# Patient Record
Sex: FEMALE | Race: BLACK OR AFRICAN AMERICAN | NOT HISPANIC OR LATINO | Employment: OTHER | URBAN - METROPOLITAN AREA
[De-identification: names, ages, dates, MRNs, and addresses within clinical notes are randomized per-mention and may not be internally consistent; named-entity substitution may affect disease eponyms.]

---

## 2017-02-08 ENCOUNTER — TELEPHONE (OUTPATIENT)
Dept: RHEUMATOLOGY | Facility: CLINIC | Age: 77
End: 2017-02-08

## 2017-02-08 NOTE — TELEPHONE ENCOUNTER
Pt. Verbalized that she is unable to make appointment to Dr. Green's appointment because of transportation issues, and ask that I let Dr. Ennis know.

## 2017-02-08 NOTE — TELEPHONE ENCOUNTER
Spoke with Shara, Lead Coordinator at Dr. Erickson Hilliard's office, and verbalized to her that the patient wanted the doctor to know that she has transportation problems and cannot make it to the [facility] to see Dr. Pandya. Shara Verbalized understanding, and reported that she would get the message to the doctor.

## 2017-02-09 ENCOUNTER — TELEPHONE (OUTPATIENT)
Dept: RHEUMATOLOGY | Facility: CLINIC | Age: 77
End: 2017-02-09

## 2017-02-09 NOTE — TELEPHONE ENCOUNTER
----- Message from Jocelyn Christopher sent at 2/9/2017 12:21 PM CST -----  Contact: self/[phone]  Patient would like to you about the appt on 2/16/17.  Please advise

## 2017-02-09 NOTE — TELEPHONE ENCOUNTER
I addressed the patients questions concerning appt info   And verbalized  that we do take [employer] health plan, and to bring her insurance card the day of the visit. Pt. Verbalized understanding.

## 2017-02-16 ENCOUNTER — INITIAL CONSULT (OUTPATIENT)
Dept: RHEUMATOLOGY | Facility: CLINIC | Age: 77
End: 2017-02-16
Payer: MEDICARE

## 2017-02-16 ENCOUNTER — HOSPITAL ENCOUNTER (OUTPATIENT)
Dept: RADIOLOGY | Facility: HOSPITAL | Age: 77
Discharge: HOME | End: 2017-02-16
Attending: INTERNAL MEDICINE
Payer: MEDICARE

## 2017-02-16 VITALS
DIASTOLIC BLOOD PRESSURE: 58 MMHG | BODY MASS INDEX: 23.36 KG/M2 | HEIGHT: 59 IN | HEART RATE: 89 BPM | WEIGHT: 115.88 LBS | SYSTOLIC BLOOD PRESSURE: 119 MMHG

## 2017-02-16 DIAGNOSIS — R53.83 FATIGUE, UNSPECIFIED TYPE: ICD-10-CM

## 2017-02-16 DIAGNOSIS — M25.522 ARTHRALGIA OF LEFT ELBOW: ICD-10-CM

## 2017-02-16 DIAGNOSIS — M13.0 POLYARTHRITIS: Primary | ICD-10-CM

## 2017-02-16 PROCEDURE — 99999 PR PBB SHADOW E&M-EST. PATIENT-LVL III: CPT | Mod: PBBFAC,,, | Performed by: INTERNAL MEDICINE

## 2017-02-16 PROCEDURE — 1159F MED LIST DOCD IN RCRD: CPT | Mod: S$GLB,,, | Performed by: INTERNAL MEDICINE

## 2017-02-16 PROCEDURE — 73030 X-RAY EXAM OF SHOULDER: CPT | Mod: TC,50,LT

## 2017-02-16 PROCEDURE — 73030 X-RAY EXAM OF SHOULDER: CPT | Mod: 26,LT,, | Performed by: RADIOLOGY

## 2017-02-16 PROCEDURE — 73090 X-RAY EXAM OF FOREARM: CPT | Mod: 26,LT,, | Performed by: RADIOLOGY

## 2017-02-16 PROCEDURE — 1160F RVW MEDS BY RX/DR IN RCRD: CPT | Mod: S$GLB,,, | Performed by: INTERNAL MEDICINE

## 2017-02-16 PROCEDURE — 73090 X-RAY EXAM OF FOREARM: CPT | Mod: TC,LT

## 2017-02-16 PROCEDURE — 99205 OFFICE O/P NEW HI 60 MIN: CPT | Mod: S$GLB,,, | Performed by: INTERNAL MEDICINE

## 2017-02-16 PROCEDURE — 73070 X-RAY EXAM OF ELBOW: CPT | Mod: 26,LT,, | Performed by: RADIOLOGY

## 2017-02-16 PROCEDURE — 73070 X-RAY EXAM OF ELBOW: CPT | Mod: TC,LT

## 2017-02-16 PROCEDURE — 73030 X-RAY EXAM OF SHOULDER: CPT | Mod: 26,RT,, | Performed by: RADIOLOGY

## 2017-02-16 PROCEDURE — 1125F AMNT PAIN NOTED PAIN PRSNT: CPT | Mod: S$GLB,,, | Performed by: INTERNAL MEDICINE

## 2017-02-16 PROCEDURE — 1157F ADVNC CARE PLAN IN RCRD: CPT | Mod: S$GLB,,, | Performed by: INTERNAL MEDICINE

## 2017-02-16 ASSESSMENT — ROUTINE ASSESSMENT OF PATIENT INDEX DATA (RAPID3)
PATIENT GLOBAL ASSESSMENT SCORE: 9
WHEN YOU AWAKENED IN THE MORNING OVER THE LAST WEEK, PLEASE INDICATE THE AMOUNT OF TIME IT TAKES UNTIL YOU ARE AS LIMBER AS YOU WILL BE FOR THE DAY: 4 HRS
FATIGUE SCORE: 9.5
TOTAL RAPID3 SCORE: 6.83
PAIN SCORE: 9.5
MDHAQ FUNCTION SCORE: .6
AM STIFFNESS SCORE: 1, YES
PSYCHOLOGICAL DISTRESS SCORE: .1

## 2017-02-16 NOTE — PROGRESS NOTES
Subjective:       Patient ID: Amber Loo is a 76 y.o. female.    Chief Complaint: Consult    HPI    75 yo F with PMH of pulmonary fibrosis diagnosed about 2015 on prednisone 10mg a day on oxygen for 2.5 years,  nasal septal perforation, CABG in 2000, glaucoma, DMII here for evaluation.  Reports she was diagnosed with pulmonary fibrosis in 2015 with shortness of breath.  She reports she has been on oxygen since diagnosis.  Denies any epistaxis. She has dry cough.  Denies hemoptysis. Reports pain in left shoulder, left elbow, and entire arm up to wrist.  Denies trauma to arm. Reports that even touching the skin hurt.  Denies any rashes, oral ulcers, raynayds, hair loss. Pain level in right arm can be as high as 10/10. Pain is aching and non-radiation.  Denies swelling of joints or joint stiffness.  Reports that she has chronic fatigue since her lung diagnosis.        Review of Systems   Constitutional: Negative for activity change, appetite change, chills, diaphoresis and fatigue.   HENT: Negative for congestion, ear discharge, ear pain, facial swelling, mouth sores, sinus pressure, sneezing, sore throat, tinnitus and trouble swallowing.    Eyes: Negative for photophobia, pain, discharge, redness, itching and visual disturbance.   Respiratory: Negative for apnea, chest tightness, shortness of breath, wheezing and stridor.    Cardiovascular: Negative for leg swelling.   Gastrointestinal: Negative for abdominal distention, abdominal pain, anal bleeding, blood in stool, constipation, diarrhea and nausea.   Endocrine: Negative for cold intolerance and heat intolerance.   Genitourinary: Negative for difficulty urinating and dysuria.   Musculoskeletal: Positive for arthralgias, gait problem and myalgias. Negative for back pain, joint swelling, neck pain and neck stiffness.   Skin: Negative for color change, pallor, rash and wound.   Neurological: Negative for dizziness, seizures, light-headedness and numbness.  "  Hematological: Negative for adenopathy. Does not bruise/bleed easily.   Psychiatric/Behavioral: Negative for sleep disturbance. The patient is not nervous/anxious.            Objective:     Visit Vitals    BP (!) 119/58 (BP Location: Right arm, Patient Position: Sitting, BP Method: Automatic)    Pulse 89    Ht 4' 11" (1.499 m)    Wt 52.6 kg (115 lb 14.4 oz)    BMI 23.41 kg/m2        Physical Exam   Constitutional: She is oriented to person, place, and time.   HENT:   Head: Normocephalic and atraumatic.   Right Ear: External ear normal.   Left Ear: External ear normal.   Nose: Nose normal.   Mouth/Throat: Oropharynx is clear and moist. No oropharyngeal exudate.   Eyes: Conjunctivae and EOM are normal. Pupils are equal, round, and reactive to light. Right eye exhibits no discharge. Left eye exhibits no discharge. No scleral icterus.   Neck: Neck supple. No JVD present. No thyromegaly present.   Cardiovascular: Normal rate, regular rhythm, normal heart sounds and intact distal pulses.  Exam reveals no gallop and no friction rub.    No murmur heard.  Pulmonary/Chest: Effort normal and breath sounds normal. No respiratory distress. She has no wheezes. She has no rales. She exhibits no tenderness.   Abdominal: Soft. Bowel sounds are normal. She exhibits no distension and no mass. There is no tenderness. There is no rebound and no guarding.   Lymphadenopathy:     She has no cervical adenopathy.   Neurological: She is alert and oriented to person, place, and time. No cranial nerve deficit. Gait normal. Coordination normal.   Skin: Skin is dry. No rash noted. No erythema. No pallor.     Psychiatric: Affect and judgment normal.   Musculoskeletal: She exhibits tenderness. She exhibits no edema or deformity.   Left shoulder with restricted ROM to 160 degrees  Pain with touching skin in left arm  No synovitis in left arm  Hands, wrists, knees, ankles,feet: no synovitis            outside   labs: reviewed   " RF-165  Bonifacio-positive  C-anca:negative [id number]  rf-65  Bonifacio-positive        Assessment:     77 yo F with PMH of pulmonary fibrosis diagnosed about 2015 on prednisone 10mg a day on oxygen for 2.5 years,  nasal septal perforation, CABG in 2000, glaucoma, DMII here for evaluation.  She has left sided pain out of proportion to exam since July 2016. Reports seeing multiple specialists including ortho and neurologist without diagnosis.Regards her shoulder pain, I suspect she may have complex regional pain syndrome but will get basic work up.    As regards to her +RF, and BONIFACIO , I will evaluate her for connective tissue diseases that may also be associated with ILD.  She does not have synovitis so do not think she has rheumatoid arthritis but will finish the evaluation.  Regarding her history of nasal perforation, she does not have signs of vasculitis and anca serologies were negative.  Labs  xrays  rtc pending results

## 2017-02-17 ENCOUNTER — TELEPHONE (OUTPATIENT)
Dept: RHEUMATOLOGY | Facility: CLINIC | Age: 77
End: 2017-02-17

## 2017-02-17 DIAGNOSIS — M15.9 PRIMARY OSTEOARTHRITIS INVOLVING MULTIPLE JOINTS: Primary | ICD-10-CM

## 2017-02-17 DIAGNOSIS — J84.10 PULMONARY FIBROSIS: Primary | ICD-10-CM

## 2017-02-17 RX ORDER — MULTIVIT WITH MINERALS/HERBS
1 TABLET ORAL DAILY
COMMUNITY

## 2017-02-17 RX ORDER — NITROGLYCERIN 0.4 MG/1
0.4 TABLET SUBLINGUAL EVERY 5 MIN PRN
COMMUNITY

## 2017-02-17 RX ORDER — INSULIN GLARGINE 100 [IU]/ML
20 INJECTION, SOLUTION SUBCUTANEOUS DAILY
COMMUNITY

## 2017-02-17 RX ORDER — IPRATROPIUM BROMIDE AND ALBUTEROL SULFATE 2.5; .5 MG/3ML; MG/3ML
3 SOLUTION RESPIRATORY (INHALATION) EVERY 6 HOURS PRN
COMMUNITY

## 2017-03-01 ENCOUNTER — TELEPHONE (OUTPATIENT)
Dept: RHEUMATOLOGY | Facility: CLINIC | Age: 77
End: 2017-03-01

## 2017-03-01 DIAGNOSIS — R76.8 RHEUMATOID FACTOR POSITIVE: Primary | ICD-10-CM

## 2017-03-01 NOTE — TELEPHONE ENCOUNTER
"Verbalized to pt. That per Dr. Pandya "tell her most of blood work is back and I suspect she may have rheumatoid arthritis and need her to do additional blood work and xrays. ". The pt. Reported that she will have to call me back because she needs to check with her insurance company about the cost.  "

## 2017-03-01 NOTE — TELEPHONE ENCOUNTER
Reviewed outside labs:  2/20/2016  Cbc- wnl  cmp-wnl  Hep B core, s Ag-negative  Hep C-negative  Ccp>250  Ace- wnl  Ck-296  Awaiting spep report: if not have final at next visit, will need to call lab devi at [facility] [phone] or [phone]

## 2017-03-01 NOTE — TELEPHONE ENCOUNTER
Reviewed outside note from pulmonary 9/2016.  Patient on prednisone 10mg day for pulmonary fibrosis and last CAT scan per report is stable.    Pulmonologist is .

## 2017-06-28 ENCOUNTER — OFFICE VISIT (OUTPATIENT)
Dept: RHEUMATOLOGY | Facility: CLINIC | Age: 77
End: 2017-06-28
Payer: MEDICARE

## 2017-06-28 ENCOUNTER — LAB VISIT (OUTPATIENT)
Dept: LAB | Facility: HOSPITAL | Age: 77
End: 2017-06-28
Attending: INTERNAL MEDICINE
Payer: MEDICARE

## 2017-06-28 VITALS
BODY MASS INDEX: 20.76 KG/M2 | SYSTOLIC BLOOD PRESSURE: 132 MMHG | HEIGHT: 59 IN | HEART RATE: 74 BPM | DIASTOLIC BLOOD PRESSURE: 86 MMHG | WEIGHT: 103 LBS

## 2017-06-28 DIAGNOSIS — M13.0 POLYARTHRITIS: ICD-10-CM

## 2017-06-28 DIAGNOSIS — M94.9 DISORDER OF CARTILAGE: ICD-10-CM

## 2017-06-28 LAB
25(OH)D3+25(OH)D2 SERPL-MCNC: 54 NG/ML
25(OH)D3+25(OH)D2 SERPL-MCNC: 54 NG/ML
CRP SERPL-MCNC: 82.9 MG/L
ERYTHROCYTE [SEDIMENTATION RATE] IN BLOOD BY WESTERGREN METHOD: 77 MM/HR

## 2017-06-28 PROCEDURE — 82306 VITAMIN D 25 HYDROXY: CPT

## 2017-06-28 PROCEDURE — 86334 IMMUNOFIX E-PHORESIS SERUM: CPT

## 2017-06-28 PROCEDURE — 84165 PROTEIN E-PHORESIS SERUM: CPT | Mod: 26,,, | Performed by: PATHOLOGY

## 2017-06-28 PROCEDURE — 84165 PROTEIN E-PHORESIS SERUM: CPT

## 2017-06-28 PROCEDURE — 1125F AMNT PAIN NOTED PAIN PRSNT: CPT | Mod: S$GLB,,, | Performed by: INTERNAL MEDICINE

## 2017-06-28 PROCEDURE — 1159F MED LIST DOCD IN RCRD: CPT | Mod: S$GLB,,, | Performed by: INTERNAL MEDICINE

## 2017-06-28 PROCEDURE — 86334 IMMUNOFIX E-PHORESIS SERUM: CPT | Mod: 26,,, | Performed by: PATHOLOGY

## 2017-06-28 PROCEDURE — 86140 C-REACTIVE PROTEIN: CPT

## 2017-06-28 PROCEDURE — 99214 OFFICE O/P EST MOD 30 MIN: CPT | Mod: S$GLB,,, | Performed by: INTERNAL MEDICINE

## 2017-06-28 PROCEDURE — 36415 COLL VENOUS BLD VENIPUNCTURE: CPT

## 2017-06-28 PROCEDURE — 86038 ANTINUCLEAR ANTIBODIES: CPT

## 2017-06-28 PROCEDURE — 99999 PR PBB SHADOW E&M-EST. PATIENT-LVL III: CPT | Mod: PBBFAC,,, | Performed by: INTERNAL MEDICINE

## 2017-06-28 PROCEDURE — 85652 RBC SED RATE AUTOMATED: CPT

## 2017-06-28 RX ORDER — HYDROXYCHLOROQUINE SULFATE 200 MG/1
200 TABLET, FILM COATED ORAL DAILY
Qty: 30 TABLET | Refills: 8 | Status: SHIPPED | OUTPATIENT
Start: 2017-06-28 | End: 2017-06-28 | Stop reason: SDUPTHER

## 2017-06-28 RX ORDER — HYDROXYCHLOROQUINE SULFATE 200 MG/1
200 TABLET, FILM COATED ORAL DAILY
Qty: 30 TABLET | Refills: 8 | Status: SHIPPED | OUTPATIENT
Start: 2017-06-28 | End: 2017-06-30 | Stop reason: SDUPTHER

## 2017-06-28 NOTE — PROGRESS NOTES
Subjective:       Patient ID: Amber Loo is a 76 y.o. female.    Chief Complaint: Consult    HPI    75 yo F with PMH of pulmonary fibrosis diagnosed about 2015 on prednisone 10mg a day on oxygen for 2.5 years,  nasal septal perforation, CABG in 2000, glaucoma, DMII here for evaluation.  Reports she was diagnosed with pulmonary fibrosis in 2015 with shortness of breath.  She reports she has been on oxygen since diagnosis.  Denies any epistaxis. She has dry cough.  Denies hemoptysis. Reports pain in left shoulder, left elbow, and entire arm up to wrist.  Denies trauma to arm. Reports that even touching the skin hurt.  Denies any rashes, oral ulcers, raynayds, hair loss. Pain level in left  arm can be as high as 10/10. Pain is aching and non-radiation.  Denies swelling of joints or joint stiffness.  Reports that she has chronic fatigue since her lung diagnosis.      Interval history: She saw pulmonary doctor  2 months and no changes were made to her therapy.   She is on 4 L oxygen which is her normal. She continues to have pain in left arm. Pain can be a high as 10/10.  Reports mild swelling in left wrist for about a year. Reports morning stiffness for about an hour.  Denies any rashes, oral ulcers, raynayds, hair loss.  Reports she has taken up to 60mg a day of prednisone with no improvement.        Review of Systems   Constitutional: Negative for activity change, appetite change, chills, diaphoresis and fatigue.   HENT: Negative for congestion, ear discharge, ear pain, facial swelling, mouth sores, sinus pressure, sneezing, sore throat, tinnitus and trouble swallowing.    Eyes: Negative for photophobia, pain, discharge, redness, itching and visual disturbance.   Respiratory: Negative for apnea, chest tightness, shortness of breath, wheezing and stridor.    Cardiovascular: Negative for leg swelling.   Gastrointestinal: Negative for abdominal distention, abdominal pain, anal bleeding, blood in stool,  constipation, diarrhea and nausea.   Endocrine: Negative for cold intolerance and heat intolerance.   Genitourinary: Negative for difficulty urinating and dysuria.   Musculoskeletal: Positive for arthralgias, gait problem and myalgias. Negative for back pain, joint swelling, neck pain and neck stiffness.   Skin: Negative for color change, pallor, rash and wound.   Neurological: Negative for dizziness, seizures, light-headedness and numbness.   Hematological: Negative for adenopathy. Does not bruise/bleed easily.   Psychiatric/Behavioral: Negative for sleep disturbance. The patient is not nervous/anxious.            Objective:       Physical Exam   Constitutional: She is oriented to person, place, and time.   HENT:   Head: Normocephalic and atraumatic.   Right Ear: External ear normal.   Left Ear: External ear normal.   Nose: Nose normal.   Mouth/Throat: Oropharynx is clear and moist. No oropharyngeal exudate.   Eyes: Conjunctivae and EOM are normal. Pupils are equal, round, and reactive to light. Right eye exhibits no discharge. Left eye exhibits no discharge. No scleral icterus.   Neck: Neck supple. No JVD present. No thyromegaly present.   Cardiovascular: Normal rate, regular rhythm, normal heart sounds and intact distal pulses.  Exam reveals no gallop and no friction rub.    No murmur heard.  Pulmonary/Chest: Effort normal and breath sounds normal. No respiratory distress. She has no wheezes. She has no rales. She exhibits no tenderness.   Abdominal: Soft. Bowel sounds are normal. She exhibits no distension and no mass. There is no tenderness. There is no rebound and no guarding.   Lymphadenopathy:     She has no cervical adenopathy.   Neurological: She is alert and oriented to person, place, and time. No cranial nerve deficit. Gait normal. Coordination normal.   Skin: Skin is dry. No rash noted. No erythema. No pallor.     Psychiatric: Affect and judgment normal.   Musculoskeletal: She exhibits tenderness. She  exhibits no edema or deformity.   Left shoulder with restricted ROM to 160 degrees  Pain with touching skin in left arm  No synovitis in left arm  Hands, wrists, knees, ankles,feet: no synovitis            outside   labs: reviewed   RF-165  Samreen-positive  C-anca:negative [id number]  rf-65      2/20/2016  Cbc- wnl  cmp-wnl  Hep B core, s Ag-negative  Hep C-negative  Ccp>250  Ace- wnl  Ck-296    Assessment:     77 yo F with PMH of pulmonary fibrosis diagnosed about 2015 on prednisone 10mg a day on oxygen for 2.5 years,  nasal septal perforation, CABG in 2000, glaucoma, DMII here for evaluation.  She has left sided pain out of proportion to exam since July 2016. Reports seeing multiple specialists including ortho and neurologist without diagnosis.  I suspect that most of her pain in left upper extremity is from left OA and complex regional pain syndrome.  Her serologies are consistent with RA so will get baseline xrays of other joints and will start her on plaquenil.  She also has mild synovitis in mcps, left wrist, and left elbow.      Reviewed outside note from pulmonary 9/2016.  Patient on prednisone 10mg day for pulmonary fibrosis and last CAT scan per report is stable.  Pulmonologist is .    Follow up with pulmonary   Labs today  Arthritis survey  Start plaquenil 200mg po qday (Risks of starting plaquenil discussed. Risks include eye toxicity and agrees on timely follow up with optho to avoid risks of eye toxicity. Other risks include rashes such has hyperpigmentation and vertigo.

## 2017-06-29 ENCOUNTER — TELEPHONE (OUTPATIENT)
Dept: RHEUMATOLOGY | Facility: CLINIC | Age: 77
End: 2017-06-29

## 2017-06-29 LAB
ALBUMIN SERPL ELPH-MCNC: 3.4 G/DL
ALPHA1 GLOB SERPL ELPH-MCNC: 0.49 G/DL
ALPHA2 GLOB SERPL ELPH-MCNC: 1.16 G/DL
ANA SER QL IF: NORMAL
B-GLOBULIN SERPL ELPH-MCNC: 0.92 G/DL
GAMMA GLOB SERPL ELPH-MCNC: 1.04 G/DL
PROT SERPL-MCNC: 7 G/DL

## 2017-06-29 RX ORDER — HYDROXYCHLOROQUINE SULFATE 200 MG/1
200 TABLET, FILM COATED ORAL DAILY
Qty: 30 TABLET | Refills: 8 | Status: CANCELLED | OUTPATIENT
Start: 2017-06-29

## 2017-06-30 LAB
INTERPRETATION SERPL IFE-IMP: NORMAL
PATHOLOGIST INTERPRETATION IFE: NORMAL
PATHOLOGIST INTERPRETATION SPE: NORMAL

## 2017-06-30 RX ORDER — HYDROXYCHLOROQUINE SULFATE 200 MG/1
200 TABLET, FILM COATED ORAL DAILY
Qty: 30 TABLET | Refills: 8 | Status: SHIPPED | OUTPATIENT
Start: 2017-06-30 | End: 2017-07-03 | Stop reason: SDUPTHER

## 2017-07-03 ENCOUNTER — TELEPHONE (OUTPATIENT)
Dept: RHEUMATOLOGY | Facility: CLINIC | Age: 77
End: 2017-07-03

## 2017-07-03 RX ORDER — HYDROXYCHLOROQUINE SULFATE 200 MG/1
200 TABLET, FILM COATED ORAL DAILY
Qty: 30 TABLET | Refills: 8 | Status: SHIPPED | OUTPATIENT
Start: 2017-07-03

## 2017-10-11 ENCOUNTER — OFFICE VISIT (OUTPATIENT)
Dept: FAMILY MEDICINE | Facility: CLINIC | Age: 77
End: 2017-10-11
Payer: MEDICARE

## 2017-10-11 VITALS
OXYGEN SATURATION: 70 % | BODY MASS INDEX: 22.26 KG/M2 | HEIGHT: 59 IN | RESPIRATION RATE: 22 BRPM | TEMPERATURE: 98 F | DIASTOLIC BLOOD PRESSURE: 62 MMHG | WEIGHT: 110.44 LBS | HEART RATE: 93 BPM | SYSTOLIC BLOOD PRESSURE: 130 MMHG

## 2017-10-11 DIAGNOSIS — Z23 FLU VACCINE NEED: ICD-10-CM

## 2017-10-11 DIAGNOSIS — Z23 NEED FOR PNEUMOCOCCAL VACCINE: ICD-10-CM

## 2017-10-11 DIAGNOSIS — J84.10 PULMONARY FIBROSIS: Primary | ICD-10-CM

## 2017-10-11 DIAGNOSIS — Z95.1 HX OF CABG: ICD-10-CM

## 2017-10-11 PROCEDURE — 99205 OFFICE O/P NEW HI 60 MIN: CPT | Mod: S$GLB,,, | Performed by: FAMILY MEDICINE

## 2017-10-11 PROCEDURE — G0008 ADMIN INFLUENZA VIRUS VAC: HCPCS | Mod: S$GLB,,, | Performed by: FAMILY MEDICINE

## 2017-10-11 PROCEDURE — 99999 PR PBB SHADOW E&M-EST. PATIENT-LVL V: CPT | Mod: PBBFAC,,, | Performed by: FAMILY MEDICINE

## 2017-10-11 PROCEDURE — 90662 IIV NO PRSV INCREASED AG IM: CPT | Mod: S$GLB,,, | Performed by: FAMILY MEDICINE

## 2017-10-11 PROCEDURE — 90670 PCV13 VACCINE IM: CPT | Mod: S$GLB,,, | Performed by: FAMILY MEDICINE

## 2017-10-11 PROCEDURE — G0009 ADMIN PNEUMOCOCCAL VACCINE: HCPCS | Mod: S$GLB,,, | Performed by: FAMILY MEDICINE

## 2017-10-11 RX ORDER — INSULIN LISPRO 100 [IU]/ML
5 INJECTION, SOLUTION INTRAVENOUS; SUBCUTANEOUS
COMMUNITY

## 2017-10-11 NOTE — PROGRESS NOTES
Pt tolerated flu vaccine to left deltoid without difficulty; no adverse reaction noted; VIS given; pt also tolerated pneumococcal 13 vaccine to right deltoid without difficulty; no adverse reaction noted; VIS given

## 2017-10-13 NOTE — PROGRESS NOTES
Subjective:       Patient ID: Amber Loo is a 77 y.o. female.    Chief Complaint: Establish Care    HPI:  Patient is a 77-year-old , new to me, here to establish care.  She has a known history of CAD, status post CABG x 3 in  2000, pulmonary fibrosis for which she is oxygen  Dependent and chronic uncontrolled diabetes.  Patient does not  Report any recent problems with her heart.  She is followed by Dr Saleh for pulmonary fibrosis.  She states her blood sugars are always elevated because ofchronic steroid therapy.  She has never been seen by an endocrinologist.  She is overdue for eye exam. Patient also followed by rheumatology for polyarthritis.  She is on plaquenil.  Review of Systems   Constitutional: Positive for fatigue. Negative for appetite change, chills, diaphoresis and fever.   HENT: Negative for hearing loss, sinus pressure and trouble swallowing.    Eyes: Negative for visual disturbance.   Respiratory: Positive for shortness of breath. Negative for cough, chest tightness and wheezing.    Cardiovascular: Negative for chest pain, palpitations and leg swelling.   Gastrointestinal: Negative for abdominal pain, blood in stool, constipation, diarrhea, nausea and vomiting.   Endocrine: Negative for polydipsia, polyphagia and polyuria.   Genitourinary: Negative for difficulty urinating, dysuria, hematuria, menstrual problem, pelvic pain and vaginal discharge.   Musculoskeletal: Positive for arthralgias. Negative for back pain, joint swelling and neck pain.   Skin: Negative for rash.   Neurological: Negative for dizziness, numbness and headaches.   Hematological: Negative for adenopathy. Does not bruise/bleed easily.   Psychiatric/Behavioral: Negative for dysphoric mood and sleep disturbance. The patient is not nervous/anxious.        Objective:      Physical Exam   Constitutional: She is oriented to person, place, and time. She appears well-developed and well-nourished. No distress.   Wearing oxygen    HENT:   Head: Normocephalic and atraumatic.   Mouth/Throat: Oropharynx is clear and moist.   Eyes: Conjunctivae are normal. Pupils are equal, round, and reactive to light.   Neck: Normal range of motion. Neck supple. No thyromegaly present.   Cardiovascular: Normal rate, regular rhythm and normal heart sounds.    No murmur heard.  Pulmonary/Chest: Effort normal. No respiratory distress. She has no wheezes.   Abdominal: Soft. Bowel sounds are normal. She exhibits no distension and no mass. There is no tenderness.   Musculoskeletal: She exhibits no edema.   Lymphadenopathy:     She has no cervical adenopathy.   Neurological: She is alert and oriented to person, place, and time.   Skin: Skin is warm and dry. No rash noted. She is not diaphoretic.   Psychiatric: She has a normal mood and affect.         Assessment:       1. Pulmonary fibrosis    2. Uncontrolled type 2 diabetes mellitus with stage 2 chronic kidney disease, with long-term current use of insulin    3. Flu vaccine need    4. Hx of CABG    5. Need for pneumococcal vaccine        Plan:       Pulmonary fibrosis  Oxygen dependent, followed by pulmonary    Uncontrolled type 2 diabetes mellitus with stage 2 chronic kidney disease, with long-term current use of insulin  -     Ambulatory referral to Podiatry  -     Ambulatory referral to Endocrinology  -     Ambulatory Referral to Diabetes Education    Flu vaccine need  -     Influenza - High Dose (65+) (PF) (IM)    Hx of CABG  Clinically stable from a cardiac standpoint    Need for pneumococcal vaccine  -     (In Office Administered) Pneumococcal Conjugate Vaccine (13 Valent) (IM)            No Follow-up on file.

## 2017-11-07 ENCOUNTER — TELEPHONE (OUTPATIENT)
Dept: FAMILY MEDICINE | Facility: CLINIC | Age: 77
End: 2017-11-07
